# Patient Record
(demographics unavailable — no encounter records)

---

## 2025-05-21 NOTE — REASON FOR VISIT
[Initial Evaluation] : an initial evaluation [FreeTextEntry1] : Evaluate for screening colonoscopy, positive family history of colon cancer.  Evaluate for screening endoscopy, abdominal pain and positive family history

## 2025-05-21 NOTE — PHYSICAL EXAM
[Alert] : alert [Normal Voice/Communication] : normal voice/communication [Healthy Appearing] : healthy appearing [No Acute Distress] : no acute distress [Sclera] : the sclera and conjunctiva were normal [Hearing Threshold Finger Rub Not [place]] : hearing was normal [Normal Lips/Gums] : the lips and gums were normal [Oropharynx] : the oropharynx was normal [Normal Appearance] : the appearance of the neck was normal [No Neck Mass] : no neck mass was observed [No Respiratory Distress] : no respiratory distress [No Acc Muscle Use] : no accessory muscle use [Respiration, Rhythm And Depth] : normal respiratory rhythm and effort [Auscultation Breath Sounds / Voice Sounds] : lungs were clear to auscultation bilaterally [Heart Rate And Rhythm] : heart rate was normal and rhythm regular [Normal S1, S2] : normal S1 and S2 [Murmurs] : no murmurs [Bowel Sounds] : normal bowel sounds [Abdomen Tenderness] : non-tender [No Masses] : no abdominal mass palpated [Abdomen Soft] : soft [] : no hepatosplenomegaly [Oriented To Time, Place, And Person] : oriented to person, place, and time [de-identified] : Well-developed well-nourished somewhat large frame and build.  No acute distress.  Moderately overweight. [FreeTextEntry1] : Anicteric sclera. [de-identified] : Moist mucosa.  No pharyngitis.  Absent tonsils. [de-identified] : Supple neck.  No adenopathy. [de-identified] : Clear. [de-identified] : No murmurs rubs or gallops. [de-identified] : No CCE. [de-identified] : Central obesity.  Bowel sounds present.  No organomegaly.  No mass tenderness or rebound.  Laparoscopy scars from gallbladder surgery. [de-identified] : Not performed today. [de-identified] : Unremarkable. [de-identified] : Normal.  Old scar from prior surgery.  Vertical and lumbar spine. [de-identified] : Intact. [de-identified] : Normal.

## 2025-05-21 NOTE — ASSESSMENT
[FreeTextEntry1] : Positive family history of 1 first-degree relative, father with colon cancer.  Patient has chronic diarrhea of unclear etiology.  Ongoing for multiple years.  Possible colitis.  Reasonable candidate for a screening colonoscopy and patient high risk due to family history.  Patient has a personal history of aspiration during endoscopic procedures.  Therefore have advised patient to have procedures done at hospital with consideration for general anesthesia. ASA #2.  Mallampati #2.  Recently had blood work that was all acceptable.  Patient already has gavel light at home in anticipation of colonoscopy.  Repeat blood work not necessary.  No clearances required.  Stable cardiac status.  Recently seen by cardiology.  No new interventions were advised.  No medication holds.  Chronic epigastric abdominal pain.  Nocturnal heartburn and regurgitation and occasional nocturnal vomiting.  Possible gastric dysmotility.  Possible hiatus hernia.  Possible erosive esophagitis.  Unlikely to be EOE.  Upper endoscopy offered to patient at same setting.  The procedure, its risk benefits and prep, were explained to the patient, who understands and is agreeable to proceed.  ASA 2.  Mallampati #2.  No clearances required.  No blood work required.  Patient appears to be in optimal medical condition to undergo both procedures.  Arrangements made.  Results to follow.

## 2025-05-21 NOTE — HISTORY OF PRESENT ILLNESS
[de-identified] : 4 years ago.  Unknown results. [FreeTextEntry1] : 2021 with Dr. Lacey at [place] [facility].  Aspirated.  Required transfer to hospital.  Unknown extent of exam.